# Patient Record
Sex: MALE | Race: WHITE | Employment: OTHER | URBAN - METROPOLITAN AREA
[De-identification: names, ages, dates, MRNs, and addresses within clinical notes are randomized per-mention and may not be internally consistent; named-entity substitution may affect disease eponyms.]

---

## 2017-01-01 ENCOUNTER — TELEPHONE (OUTPATIENT)
Dept: GASTROENTEROLOGY | Facility: CLINIC | Age: 82
End: 2017-01-01

## 2017-04-26 NOTE — TELEPHONE ENCOUNTER
[facility] know that pt has had an increase in belching, loose stools and stomach cramping in the last week. Please call w/ orders. She will also fax over lab results to this office that were done today.

## 2017-04-26 NOTE — TELEPHONE ENCOUNTER
[facility] contacted to review below. She states for the moment [facility] see Lauren [facility]imes. She states since she spoke with daughter earlier, pt had another episode.   She advised to call daughter for more detai

## 2017-04-26 NOTE — TELEPHONE ENCOUNTER
The symptoms could be due to a variety of causes. Optimally the patient should be evaluated in the office although it may be difficult for him to travel here. Alternatively he could be seen by his PCP.   A fecal impaction versus gastroenteritis or other c

## 2017-04-26 NOTE — TELEPHONE ENCOUNTER
See below more details. I already reviewed with daughter that pt needs to be evaluated before rx can be given. Nicolle and [facility] RN want to know if any further orders are needed (i.e. Stool test).   If no will review below again with Vasile Burks tomorrow to call

## 2017-04-26 NOTE — TELEPHONE ENCOUNTER
Routed to Sanjay Johnson. Are you addressing Home Health orders?  Please advise  (no labs received as of today)

## 2017-04-27 NOTE — TELEPHONE ENCOUNTER
Can the home health RN check for a fecal impaction? If negative for impaction we can order a stool culture and C. difficile toxin assay.   If unable to check for impaction at home we can still order the stool studies, however, I cannot exclude a fecal impa